# Patient Record
Sex: FEMALE | Race: WHITE | NOT HISPANIC OR LATINO | Employment: FULL TIME | URBAN - METROPOLITAN AREA
[De-identification: names, ages, dates, MRNs, and addresses within clinical notes are randomized per-mention and may not be internally consistent; named-entity substitution may affect disease eponyms.]

---

## 2017-03-31 ENCOUNTER — HOSPITAL ENCOUNTER (EMERGENCY)
Facility: HOSPITAL | Age: 22
Discharge: HOME | End: 2017-03-31
Attending: EMERGENCY MEDICINE | Admitting: EMERGENCY MEDICINE

## 2017-03-31 VITALS
RESPIRATION RATE: 18 BRPM | OXYGEN SATURATION: 100 % | DIASTOLIC BLOOD PRESSURE: 63 MMHG | HEIGHT: 65 IN | BODY MASS INDEX: 43.32 KG/M2 | WEIGHT: 260 LBS | HEART RATE: 98 BPM | SYSTOLIC BLOOD PRESSURE: 101 MMHG | TEMPERATURE: 97.9 F

## 2017-03-31 DIAGNOSIS — R41.0 INTERMITTENT CONFUSION: Primary | ICD-10-CM

## 2017-03-31 LAB
B-HCG UR QL: NEGATIVE
INTERNAL NEGATIVE CONTROL: NEGATIVE
INTERNAL POSITIVE CONTROL: POSITIVE
Lab: NORMAL

## 2017-03-31 PROCEDURE — 99283 EMERGENCY DEPT VISIT LOW MDM: CPT

## 2017-04-01 NOTE — ED PROVIDER NOTES
Subjective   HPI Comments: 21 y.o. female presents to the ED w/ c/o confusion since last night. Pt hit her head against a refrigerator door a couple months ago after standing up suddenly while reaching into the fridge. She states she had LOC a few minutes later and subsequently developed nausea, headache, confusion, photophobia, and phonophobia. Seen at [facility] 2 weeks later and diagnosed with concussion and told to take Ibuprofen.     Pt felt confused and disoriented last night. She was on the phone with a friend and states she was walking and did not realize where she was going and was briefly unsure of where she was. She is not confused currently. She denies any dizziness, numbness, or weakness.    PMHx significant for bipolar disorder, anxiety, and major recurrent depression. She is followed by psychiatrist Dr. Cowan. She is not suicidal currently.    Patient is a 21 y.o. female presenting with altered mental status.   History provided by:  Patient  Altered Mental Status   Presenting symptoms: confusion and disorientation    Severity:  Mild  Most recent episode:  Yesterday  Episode history:  Single  Timing:  Intermittent  Chronicity:  New  Context: head injury    Recent head injury: 2 months ago.  Associated symptoms: no fever, no nausea, no slurred speech, no suicidal behavior, no visual change and no vomiting        Review of Systems   Constitutional: Negative for fever.   Gastrointestinal: Negative for nausea and vomiting.   Psychiatric/Behavioral: Positive for confusion.   All other systems reviewed and are negative.      Past Medical History:   Diagnosis Date   • Anxiety    • Bipolar disorder    • Concussion    • GERD (gastroesophageal reflux disease)    • Panic disorder    • Recurrent major depression        Allergies   Allergen Reactions   • Cefzil [Cefprozil]    • Penicillins        History reviewed. No pertinent surgical history.    History reviewed. No pertinent family history.    Social History      Social History   • Marital status: Single     Spouse name: N/A   • Number of children: N/A   • Years of education: N/A     Social History Main Topics   • Smoking status: Former Smoker   • Smokeless tobacco: None      Comment: quit 3 months ago   • Alcohol use No   • Drug use: No   • Sexual activity: Not Asked     Other Topics Concern   • None     Social History Narrative   • None         Objective   Physical Exam   Constitutional: She is oriented to person, place, and time. She appears well-developed and well-nourished. No distress.   HENT:   Head: Normocephalic and atraumatic.   Eyes: Conjunctivae are normal. Pupils are equal, round, and reactive to light.   Neck: Neck supple.   Cardiovascular: Normal rate, regular rhythm and normal heart sounds.    Pulmonary/Chest: Effort normal and breath sounds normal. No respiratory distress. She exhibits no tenderness.   Abdominal: Soft. Bowel sounds are normal. There is no tenderness.   Musculoskeletal: Normal range of motion. She exhibits no edema.   Neurological: She is alert and oriented to person, place, and time.   Skin: Skin is warm and dry.   Psychiatric: She has a normal mood and affect. Her behavior is normal.   Nursing note and vitals reviewed.      Procedures         ED Course  ED Course       Recent Results (from the past 24 hour(s))   POCT, urine preg    Collection Time: 03/31/17 10:17 PM   Result Value Ref Range    HCG, Urine, QL Negative Negative    Lot Number [id number]     Internal Positive Control Positive     Internal Negative Control Negative      Note: In addition to lab results from this visit, the labs listed above may include labs taken at another facility or during a different encounter within the last 24 hours. Please correlate lab times with ED admission and discharge times for further clarification of the services performed during this visit.    No orders to display     Vitals:    03/31/17 2138 03/31/17 2230 03/31/17 2231 03/31/17 2300   BP:  "142/82 112/67  101/63   Pulse: 101  94 88   Resp: 20      Temp: 97.9 °F (36.6 °C)      TempSrc: Oral      SpO2: 99%  100% 99%   Weight: 260 lb (118 kg)      Height: 65\" (165.1 cm)        Medications - No data to display  ECG/EMG Results (last 24 hours)     ** No results found for the last 24 hours. **                      MDM  Number of Diagnoses or Management Options  Intermittent confusion: new and requires workup  Diagnosis management comments: Patient is not felt to be suffering from postconcussive syndrome, doubt concussion was ever present give mild mechanism of injury.      Will DC with advised outpatient psychiatric evaluation.     Patinet appears well, denies suicidal ideation currently.        Amount and/or Complexity of Data Reviewed  Obtain history from someone other than the patient: yes  Review and summarize past medical records: yes  Independent visualization of images, tracings, or specimens: yes    Patient Progress  Patient progress: stable      Final diagnoses:   Intermittent confusion       Documentation assistance provided by dane Mccormack.  Information recorded by the scribe was done at my direction and has been verified and validated by me.     Cristina Mccormack  03/31/17 2315       Cristina Mccormack  03/31/17 2317       Joie Iverson MD  03/31/17 2318       Joie Iverson MD  03/31/17 2319    "

## 2017-04-04 ENCOUNTER — TELEPHONE (OUTPATIENT)
Dept: OBSTETRICS AND GYNECOLOGY | Facility: CLINIC | Age: 22
End: 2017-04-04

## 2017-04-04 NOTE — TELEPHONE ENCOUNTER
----- Message from Timbo Cartwright sent at 4/4/2017  2:51 PM EDT -----  [phone]    PT HAS NEW APT SCHEDULED NEXT WEEK WITH [facility] ON 04/19/2017    SHE WAS SEEN AT THE ED ON Friday, EXPERIENCING SOME REALLY BAD CRAMPING. THEY DONE A UA AND TOLD HER THE PREGNANCY TEST WAS NEGATIVE. ABOUT FOUR WEEKS AGO WHEN SHE CALLED TO MAKE THE APT, SHE HAD TAKEN TWO POSITIVE TEST.    IS WANTING TO KNOW IF SHE CAN BE SEEN BY SOMEONE ELSE SINCE [facility] IS OUT THIS WEEK.      [phone] patient to do a first morning urine pregnancy test and call tomorrow with results.... CHARLI, CMA

## 2017-04-05 ENCOUNTER — TELEPHONE (OUTPATIENT)
Dept: OBSTETRICS AND GYNECOLOGY | Facility: CLINIC | Age: 22
End: 2017-04-05

## 2017-04-05 DIAGNOSIS — N94.6 DYSMENORRHEA: Primary | ICD-10-CM

## 2017-04-05 NOTE — TELEPHONE ENCOUNTER
----- Message from Shannan Wolf sent at 4/5/2017 10:53 AM EDT -----  Pt would like you to call her regarding a pregnancy test.  [phone].

## 2017-04-05 NOTE — TELEPHONE ENCOUNTER
Pt states she spoke with David yesterday and was told to call back with UPT results. Pt states her UPT was negative. Advised her to keep appt with Dr. Cardozo on 04/13/17 with u/s to discuss cramping and pain.       Advised front office to change pt from NOB to NGYN with u/s

## 2017-04-11 PROBLEM — Z01.419 WELL WOMAN EXAM WITH ROUTINE GYNECOLOGICAL EXAM: Status: ACTIVE | Noted: 2017-04-11

## 2017-04-13 ENCOUNTER — OFFICE VISIT (OUTPATIENT)
Dept: OBSTETRICS AND GYNECOLOGY | Facility: CLINIC | Age: 22
End: 2017-04-13

## 2017-04-13 ENCOUNTER — APPOINTMENT (OUTPATIENT)
Dept: LAB | Facility: HOSPITAL | Age: 22
End: 2017-04-13

## 2017-04-13 VITALS
SYSTOLIC BLOOD PRESSURE: 122 MMHG | HEIGHT: 65 IN | BODY MASS INDEX: 48.82 KG/M2 | DIASTOLIC BLOOD PRESSURE: 76 MMHG | RESPIRATION RATE: 14 BRPM | WEIGHT: 293 LBS

## 2017-04-13 DIAGNOSIS — N92.6 IRREGULAR MENSES: Primary | ICD-10-CM

## 2017-04-13 LAB
HCG INTACT+B SERPL-ACNC: <5 MIU/ML
TSH SERPL DL<=0.05 MIU/L-ACNC: 1.44 MIU/ML (ref 0.35–5.35)

## 2017-04-13 PROCEDURE — 99202 OFFICE O/P NEW SF 15 MIN: CPT | Performed by: OBSTETRICS & GYNECOLOGY

## 2017-04-13 PROCEDURE — 84443 ASSAY THYROID STIM HORMONE: CPT | Performed by: OBSTETRICS & GYNECOLOGY

## 2017-04-13 PROCEDURE — 84702 CHORIONIC GONADOTROPIN TEST: CPT | Performed by: OBSTETRICS & GYNECOLOGY

## 2017-04-13 PROCEDURE — 36415 COLL VENOUS BLD VENIPUNCTURE: CPT | Performed by: OBSTETRICS & GYNECOLOGY

## 2017-04-13 RX ORDER — PROPRANOLOL HYDROCHLORIDE 10 MG/1
10 TABLET ORAL 2 TIMES DAILY
COMMUNITY

## 2017-04-13 RX ORDER — MEDROXYPROGESTERONE ACETATE 10 MG/1
10 TABLET ORAL DAILY
Qty: 10 TABLET | Refills: 0 | Status: SHIPPED | OUTPATIENT
Start: 2017-04-13

## 2017-04-13 RX ORDER — LITHIUM CARBONATE 300 MG/1
300 CAPSULE ORAL
COMMUNITY

## 2017-04-13 RX ORDER — OMEPRAZOLE 40 MG/1
40 CAPSULE, DELAYED RELEASE ORAL DAILY
COMMUNITY

## 2017-04-13 NOTE — PROGRESS NOTES
"Subjective   Chief Complaint   Patient presents with   • Establish Care     Rosa Bernstein is a 21 y.o. year old .  Patient's last menstrual period was 2017 (exact date).  She presents to be seen because of question of pregnancy.  She's hs irregular cylces. She had 2 home pregnancy at the end of February that were both positive. Subsequent to that he was seen in the emergncy department where she has had 2 negative pregnancy tests.   If she is not pregnant, she would like to conceive shortly.   She is sexually monogamous.    The following portions of the patient's history were reviewed and updated as appropriate:current medications, allergies, past family history, past medical history, past social history and past surgical history    Smoking status: Former Smoker                                                              Packs/day: 0.25      Years: 0.00         Types: Cigarettes     Start date:      Quit date: 2017     Smokeless status: Not on file                     Comment: quit 3 months ago         Objective   /76  Resp 14  Ht 65\" (165.1 cm)  Wt (!) 311 lb (141 kg)  LMP 2017 (Exact Date)  Breastfeeding? No  BMI 51.75 kg/m2    Lab Review   No data reviewed    Imaging   No data reviewed        Assessment   1. Oligomenorrhea-most likely related to anovulatory bleeding  2. Bipolar disorder on lithium  3. Absent seizures workup underway  4. Considering conception     Plan   1. Check a pregnancy test along with TSH.  If she is not pregnant give Provera 10 mg for 10 days  2. Follow-up in 3 weeks to see response to Provera withdrawal  3. Ultimately if she chooses to conceive, the importance of medication management, folic acid supplementation and weight management will need to be discussed before proceeding  4. Follow up for recheck of Sx 1 month         This note was electronically signed.    Korey Cardozo M.D.  2017    Total time spent today with Rosa  was " 25 minutes (level 2).  Greater than 50% of the time was spent coordinating care, answering her questions and counseling regarding pathophysiology of her presenting problem along with plans for any diagnositc work-up and treatment.

## 2017-04-19 RX ORDER — MEDROXYPROGESTERONE ACETATE 10 MG/1
TABLET ORAL
Qty: 10 TABLET | Refills: 0 | OUTPATIENT
Start: 2017-04-19

## 2017-05-12 ENCOUNTER — OFFICE VISIT (OUTPATIENT)
Dept: OBSTETRICS AND GYNECOLOGY | Facility: CLINIC | Age: 22
End: 2017-05-12

## 2017-05-12 VITALS — BODY MASS INDEX: 52.59 KG/M2 | RESPIRATION RATE: 14 BRPM | WEIGHT: 293 LBS

## 2017-05-12 DIAGNOSIS — E66.01 MORBID OBESITY WITH BMI OF 50.0-59.9, ADULT (HCC): ICD-10-CM

## 2017-05-12 DIAGNOSIS — N91.5 OLIGOMENORRHEA: Primary | ICD-10-CM

## 2017-05-12 DIAGNOSIS — Z31.69 ENCOUNTER FOR PRECONCEPTION CONSULTATION: ICD-10-CM

## 2017-05-12 PROCEDURE — 99214 OFFICE O/P EST MOD 30 MIN: CPT | Performed by: NURSE PRACTITIONER

## 2017-06-08 ENCOUNTER — HOSPITAL ENCOUNTER (EMERGENCY)
Facility: HOSPITAL | Age: 22
Discharge: HOME | End: 2017-06-09
Attending: EMERGENCY MEDICINE | Admitting: EMERGENCY MEDICINE

## 2017-06-08 DIAGNOSIS — R51.9 ACUTE NONINTRACTABLE HEADACHE, UNSPECIFIED HEADACHE TYPE: Primary | ICD-10-CM

## 2017-06-08 DIAGNOSIS — Z87.828 HISTORY OF MOTOR VEHICLE ACCIDENT: ICD-10-CM

## 2017-06-08 DIAGNOSIS — J30.2 SEASONAL ALLERGIC RHINITIS, UNSPECIFIED ALLERGIC RHINITIS TRIGGER: ICD-10-CM

## 2017-06-08 PROCEDURE — 99283 EMERGENCY DEPT VISIT LOW MDM: CPT

## 2017-06-08 RX ORDER — SODIUM CHLORIDE 0.9 % (FLUSH) 0.9 %
10 SYRINGE (ML) INJECTION AS NEEDED
Status: DISCONTINUED | OUTPATIENT
Start: 2017-06-08 | End: 2017-06-09 | Stop reason: HOSPADM

## 2017-06-09 ENCOUNTER — APPOINTMENT (OUTPATIENT)
Dept: CT IMAGING | Facility: HOSPITAL | Age: 22
End: 2017-06-09

## 2017-06-09 VITALS
HEART RATE: 89 BPM | SYSTOLIC BLOOD PRESSURE: 134 MMHG | WEIGHT: 260 LBS | BODY MASS INDEX: 46.07 KG/M2 | HEIGHT: 63 IN | DIASTOLIC BLOOD PRESSURE: 62 MMHG | TEMPERATURE: 98.5 F | RESPIRATION RATE: 18 BRPM | OXYGEN SATURATION: 98 %

## 2017-06-09 PROCEDURE — 25010000002 KETOROLAC TROMETHAMINE PER 15 MG: Performed by: EMERGENCY MEDICINE

## 2017-06-09 PROCEDURE — 96361 HYDRATE IV INFUSION ADD-ON: CPT

## 2017-06-09 PROCEDURE — 96374 THER/PROPH/DIAG INJ IV PUSH: CPT

## 2017-06-09 PROCEDURE — 25010000002 PROCHLORPERAZINE EDISYLATE PER 10 MG: Performed by: EMERGENCY MEDICINE

## 2017-06-09 PROCEDURE — 70450 CT HEAD/BRAIN W/O DYE: CPT

## 2017-06-09 PROCEDURE — 96375 TX/PRO/DX INJ NEW DRUG ADDON: CPT

## 2017-06-09 RX ORDER — KETOROLAC TROMETHAMINE 15 MG/ML
10 INJECTION, SOLUTION INTRAMUSCULAR; INTRAVENOUS ONCE
Status: COMPLETED | OUTPATIENT
Start: 2017-06-09 | End: 2017-06-09

## 2017-06-09 RX ORDER — FLUTICASONE PROPIONATE 50 MCG
2 SPRAY, SUSPENSION (ML) NASAL DAILY
Qty: 1 EACH | Refills: 0 | Status: SHIPPED | OUTPATIENT
Start: 2017-06-09 | End: 2017-07-09

## 2017-06-09 RX ADMIN — SODIUM CHLORIDE 1000 ML: 9 INJECTION, SOLUTION INTRAVENOUS at 01:19

## 2017-06-09 RX ADMIN — PROCHLORPERAZINE EDISYLATE 10 MG: 5 INJECTION INTRAMUSCULAR; INTRAVENOUS at 01:20

## 2017-06-09 RX ADMIN — KETOROLAC TROMETHAMINE 10 MG: 15 INJECTION, SOLUTION INTRAMUSCULAR; INTRAVENOUS at 01:20
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
